# Patient Record
Sex: FEMALE | Race: WHITE | NOT HISPANIC OR LATINO | URBAN - METROPOLITAN AREA
[De-identification: names, ages, dates, MRNs, and addresses within clinical notes are randomized per-mention and may not be internally consistent; named-entity substitution may affect disease eponyms.]

---

## 2025-04-22 ENCOUNTER — EMERGENCY (EMERGENCY)
Facility: HOSPITAL | Age: 75
LOS: 1 days | End: 2025-04-22
Attending: STUDENT IN AN ORGANIZED HEALTH CARE EDUCATION/TRAINING PROGRAM | Admitting: STUDENT IN AN ORGANIZED HEALTH CARE EDUCATION/TRAINING PROGRAM
Payer: MEDICARE

## 2025-04-22 VITALS
WEIGHT: 149.91 LBS | RESPIRATION RATE: 17 BRPM | DIASTOLIC BLOOD PRESSURE: 90 MMHG | OXYGEN SATURATION: 98 % | TEMPERATURE: 98 F | SYSTOLIC BLOOD PRESSURE: 173 MMHG | HEART RATE: 81 BPM

## 2025-04-22 VITALS
RESPIRATION RATE: 17 BRPM | SYSTOLIC BLOOD PRESSURE: 175 MMHG | OXYGEN SATURATION: 97 % | TEMPERATURE: 98 F | DIASTOLIC BLOOD PRESSURE: 91 MMHG | HEART RATE: 73 BPM

## 2025-04-22 LAB
ALBUMIN SERPL ELPH-MCNC: 3.6 G/DL — SIGNIFICANT CHANGE UP (ref 3.3–5)
ALP SERPL-CCNC: 58 U/L — SIGNIFICANT CHANGE UP (ref 40–120)
ALT FLD-CCNC: 22 U/L — SIGNIFICANT CHANGE UP (ref 12–78)
ANION GAP SERPL CALC-SCNC: 6 MMOL/L — SIGNIFICANT CHANGE UP (ref 5–17)
ANION GAP SERPL CALC-SCNC: 7 MMOL/L — SIGNIFICANT CHANGE UP (ref 5–17)
AST SERPL-CCNC: 21 U/L — SIGNIFICANT CHANGE UP (ref 15–37)
BASOPHILS # BLD AUTO: 0.08 K/UL — SIGNIFICANT CHANGE UP (ref 0–0.2)
BASOPHILS NFR BLD AUTO: 0.7 % — SIGNIFICANT CHANGE UP (ref 0–2)
BILIRUB SERPL-MCNC: 0.3 MG/DL — SIGNIFICANT CHANGE UP (ref 0.2–1.2)
BUN SERPL-MCNC: 38 MG/DL — HIGH (ref 7–23)
BUN SERPL-MCNC: 44 MG/DL — HIGH (ref 7–23)
CALCIUM SERPL-MCNC: 7.9 MG/DL — LOW (ref 8.5–10.1)
CALCIUM SERPL-MCNC: 9.3 MG/DL — SIGNIFICANT CHANGE UP (ref 8.5–10.1)
CHLORIDE SERPL-SCNC: 105 MMOL/L — SIGNIFICANT CHANGE UP (ref 96–108)
CHLORIDE SERPL-SCNC: 114 MMOL/L — HIGH (ref 96–108)
CO2 SERPL-SCNC: 22 MMOL/L — SIGNIFICANT CHANGE UP (ref 22–31)
CO2 SERPL-SCNC: 25 MMOL/L — SIGNIFICANT CHANGE UP (ref 22–31)
CREAT SERPL-MCNC: 1.9 MG/DL — HIGH (ref 0.5–1.3)
CREAT SERPL-MCNC: 2.2 MG/DL — HIGH (ref 0.5–1.3)
EGFR: 23 ML/MIN/1.73M2 — LOW
EGFR: 23 ML/MIN/1.73M2 — LOW
EGFR: 27 ML/MIN/1.73M2 — LOW
EGFR: 27 ML/MIN/1.73M2 — LOW
EOSINOPHIL # BLD AUTO: 0.34 K/UL — SIGNIFICANT CHANGE UP (ref 0–0.5)
EOSINOPHIL NFR BLD AUTO: 3.2 % — SIGNIFICANT CHANGE UP (ref 0–6)
GLUCOSE SERPL-MCNC: 91 MG/DL — SIGNIFICANT CHANGE UP (ref 70–99)
GLUCOSE SERPL-MCNC: 98 MG/DL — SIGNIFICANT CHANGE UP (ref 70–99)
HCT VFR BLD CALC: 38.2 % — SIGNIFICANT CHANGE UP (ref 34.5–45)
HGB BLD-MCNC: 12.7 G/DL — SIGNIFICANT CHANGE UP (ref 11.5–15.5)
IMM GRANULOCYTES NFR BLD AUTO: 0.4 % — SIGNIFICANT CHANGE UP (ref 0–0.9)
LYMPHOCYTES # BLD AUTO: 3.67 K/UL — HIGH (ref 1–3.3)
LYMPHOCYTES # BLD AUTO: 34.3 % — SIGNIFICANT CHANGE UP (ref 13–44)
MCHC RBC-ENTMCNC: 28.9 PG — SIGNIFICANT CHANGE UP (ref 27–34)
MCHC RBC-ENTMCNC: 33.2 G/DL — SIGNIFICANT CHANGE UP (ref 32–36)
MCV RBC AUTO: 86.8 FL — SIGNIFICANT CHANGE UP (ref 80–100)
MONOCYTES # BLD AUTO: 1.1 K/UL — HIGH (ref 0–0.9)
MONOCYTES NFR BLD AUTO: 10.3 % — SIGNIFICANT CHANGE UP (ref 2–14)
NEUTROPHILS # BLD AUTO: 5.46 K/UL — SIGNIFICANT CHANGE UP (ref 1.8–7.4)
NEUTROPHILS NFR BLD AUTO: 51.1 % — SIGNIFICANT CHANGE UP (ref 43–77)
NRBC BLD AUTO-RTO: 0 /100 WBCS — SIGNIFICANT CHANGE UP (ref 0–0)
PLATELET # BLD AUTO: 194 K/UL — SIGNIFICANT CHANGE UP (ref 150–400)
POTASSIUM SERPL-MCNC: 4.6 MMOL/L — SIGNIFICANT CHANGE UP (ref 3.5–5.3)
POTASSIUM SERPL-MCNC: 4.8 MMOL/L — SIGNIFICANT CHANGE UP (ref 3.5–5.3)
POTASSIUM SERPL-SCNC: 4.6 MMOL/L — SIGNIFICANT CHANGE UP (ref 3.5–5.3)
POTASSIUM SERPL-SCNC: 4.8 MMOL/L — SIGNIFICANT CHANGE UP (ref 3.5–5.3)
PROT SERPL-MCNC: 7.4 G/DL — SIGNIFICANT CHANGE UP (ref 6–8.3)
RBC # BLD: 4.4 M/UL — SIGNIFICANT CHANGE UP (ref 3.8–5.2)
RBC # FLD: 13.3 % — SIGNIFICANT CHANGE UP (ref 10.3–14.5)
SODIUM SERPL-SCNC: 137 MMOL/L — SIGNIFICANT CHANGE UP (ref 135–145)
SODIUM SERPL-SCNC: 142 MMOL/L — SIGNIFICANT CHANGE UP (ref 135–145)
WBC # BLD: 10.69 K/UL — HIGH (ref 3.8–10.5)
WBC # FLD AUTO: 10.69 K/UL — HIGH (ref 3.8–10.5)

## 2025-04-22 PROCEDURE — 80053 COMPREHEN METABOLIC PANEL: CPT

## 2025-04-22 PROCEDURE — 80048 BASIC METABOLIC PNL TOTAL CA: CPT

## 2025-04-22 PROCEDURE — 85025 COMPLETE CBC W/AUTO DIFF WBC: CPT

## 2025-04-22 PROCEDURE — 99284 EMERGENCY DEPT VISIT MOD MDM: CPT

## 2025-04-22 PROCEDURE — 36415 COLL VENOUS BLD VENIPUNCTURE: CPT

## 2025-04-22 RX ADMIN — Medication 2000 MILLILITER(S): at 17:46

## 2025-04-22 NOTE — ED ADULT NURSE REASSESSMENT NOTE - NS ED NURSE REASSESS COMMENT FT1
Report rec'd for pt in ED sent for elevated BUN/CRT. NAD and ambulatory at this time. Bed in lowest position, safety maintained, nursing care ongoing. Report rec'd for pt in ED sent for elevated BUN/CRT. Repeat labs sent. NAD and ambulatory at this time. Bed in lowest position, safety maintained, nursing care ongoing.

## 2025-04-22 NOTE — ED PROVIDER NOTE - CLINICAL SUMMARY MEDICAL DECISION MAKING FREE TEXT BOX
Ned ATTG   74-year-old female hypertension on lisinopril for 1 week but stopping secondary to having a dry cough from its patient coming in for elevated creatinine levels 1.7 at outpt labs.     GENERAL: Awake, alert, NAD  LUNGS: non labored breathing   CARDIAC: RRR, no m/r/g  ABDOMEN: Soft, , non tender, non distended, no rebound, no guarding  EXT: No edema, no calf tenderness no deformities.  NEURO: A&Ox3. Moving all extremities.  SKIN: Warm and dry. No rash.  PSYCH: Normal affect.    given hx and pe plan for labs- fluids  Cr check

## 2025-04-22 NOTE — ED ADULT NURSE NOTE - NS ED NURSE IV DC DT
"Physical Therapy Treatment completed.   Bed Mobility:  Supine to Sit: Supervised  Transfers: Sit to Stand: Stand by Assist (with FWW)  Gait: Level Of Assist: Stand by Assist with Front-Wheel Walker  20ft x 2      Plan of Care: Will benefit from Physical Therapy 4 times per week  Discharge Recommendations: Equipment: Front-Wheel Walker.     Pt seen for stairs assessment, became very anixous when discussing entry, stating 'i'm not staying here because stairs'; discussed/demo'd forward/backward and side options, pt prefers to utilizing 2 w/c bump with family vs bump up stairs as she is anxious to try hopping method; pt verbalizing comfort of returning home; discussed timing of outpt PT, this should not be pursued anytime soon until her ROM is at least liberalized, it will be a waste of copay and coverage; will follow if remains in house.       See \"Rehab Therapy-Acute\" Patient Summary Report for complete documentation.       " 22-Apr-2025 21:50

## 2025-04-22 NOTE — ED ADULT NURSE NOTE - ED STAT RN HANDOFF DETAILS
D/c instructions reviewed, verbalized understanding. VS stable, IV removed. Pt ambulatory, left ED w/ family in stable condition.

## 2025-04-22 NOTE — ED PROVIDER NOTE - PROGRESS NOTE DETAILS
Ned ATTG pt made aware of increasing cr, plan for fluids rpt, dispo pending scuh Ned SHE[facility] Spoke with the patient in regards to the elevated creatinine but did improve after fluids.  With still elevated patient just stopped lisinopril unclear if medication related patient states she will call her nephrologist tomorrow offered patient admission but declines that she will follow-up outpatient  advised to stop taking any nsaids

## 2025-04-22 NOTE — ED PROVIDER NOTE - NSFOLLOWUPINSTRUCTIONS_ED_ALL_ED_FT
Creatine is a chemical that muscles need in order to tighten (contract) during activity. Creatine helps the body produce a molecule called adenosine triphosphate (ATP). ATP is the main source of energy that muscles need to function. In some cases, creatine supplementation may make this process faster and more efficient. This may help muscles contract more efficiently.    Creatine is naturally produced in the body and is found in certain foods, such as fish and red meat. It is also available as a dietary supplement, which is usually in the form of a powder or a sports drink.    Why is creatine used in athletics?  Creatine supplements may be used to:  Build muscle.  Increase muscle strength.  Increase the rate of recovery from injury.  Improve performance in short-duration, high-intensity exercise.  Increase training intensity.  Improve the quality of workouts.  What are the side effects of using creatine?  Possible side effects of creatine supplements include:  Muscle cramps.  Muscle tightness.  Weight gain.  Nausea.  Stomach cramps.  Diarrhea.  A decrease in your body's ability to produce creatine naturally after you stop using synthetic creatine supplements.  What do I need to know about creatine use?  A comparison of three sample cups showing dark yellow, yellow, and pale yellow urine.  General information    Usually, professional sports associations do not ban the use of creatine supplements. Creatine may help to improve athletic performance in young, healthy people during short periods of rigorous physical activity, such as sprinting or weight lifting. However, creatine supplements do not improve athletic performance in every person who uses them. It has little effect on endurance sports.    Some athletes eat carbohydrates, such as bread, pasta, and potatoes (carbohydrate loading) along with creatine supplements to enhance energy levels and performance even more.    Warnings    Ask your health care provider if it is safe for you to take creatine.    Do not take creatine if you have a history of kidney disease or you have conditions such as diabetes that increase your risk of kidney problems.    Do not combine creatine supplements with caffeine or other stimulants or with herbal performance enhancers. Doing this could increase your risk for serious side effects, including stroke. Take these actions to lower your risk of side effects when you use creatine supplements:  Drink enough water to keep your urine pale yellow.  Make sure you get the recommended amount of potassium, also called Recommended Dietary Allowance, or RDA.  Summary  Creatine is a chemical that muscles need in order to tighten (contract) during activity.  Creatine is naturally produced in the body and is found in certain foods, such as fish and red meat.  Creatine is available as a dietary supplement, which is usually in the form of a powder or a sports drink.  Usually, professional sports associations do not ban the use of creatine supplements.  This information is not intended to replace advice given to you by your health care provider. Make sure you discuss any questions you have with your health care provider.    Document Revised: 01/10/2023 Document Reviewed: 01/10/2023

## 2025-04-22 NOTE — ED ADULT NURSE NOTE - NSFALLUNIVINTERV_ED_ALL_ED
Bed/Stretcher in lowest position, wheels locked, appropriate side rails in place/Call bell, personal items and telephone in reach/Instruct patient to call for assistance before getting out of bed/chair/stretcher/Non-slip footwear applied when patient is off stretcher/[place] to call system/Physically safe environment - no spills, clutter or unnecessary equipment/Purposeful proactive rounding/Room/bathroom lighting operational, light cord in reach

## 2025-04-22 NOTE — ED ADULT NURSE NOTE - OBJECTIVE STATEMENT
pt states she was sent by her PCP for elevated kidney function. pt denies back pain, dysuria, pt in no acute distress. pt updated on plan of care

## 2025-04-22 NOTE — ED PROVIDER NOTE - PATIENT PORTAL LINK FT
You can access the FollowMyHealth Patient Portal offered by [facility] by registering at the following website: [email or web address]. By joining [facility]’s FollowMyHealth portal, you will also be able to view your health information using other applications (apps) compatible with our system.

## 2025-04-22 NOTE — ED ADULT TRIAGE NOTE - CHIEF COMPLAINT QUOTE
Abnormal labs.  Sent by PMD for increased BUN/Creatitnine.  Pt believes she is dehydrated.  Recently started lisinopril for HTN.